# Patient Record
Sex: MALE | Race: WHITE | NOT HISPANIC OR LATINO | URBAN - METROPOLITAN AREA
[De-identification: names, ages, dates, MRNs, and addresses within clinical notes are randomized per-mention and may not be internally consistent; named-entity substitution may affect disease eponyms.]

---

## 2017-11-21 ENCOUNTER — EMERGENCY (EMERGENCY)
Facility: HOSPITAL | Age: 37
LOS: 1 days | Discharge: HOME | End: 2017-11-21
Attending: EMERGENCY MEDICINE
Payer: MEDICAID

## 2017-11-21 VITALS
HEART RATE: 120 BPM | TEMPERATURE: 100 F | HEIGHT: 73 IN | SYSTOLIC BLOOD PRESSURE: 157 MMHG | WEIGHT: 235.01 LBS | OXYGEN SATURATION: 99 % | RESPIRATION RATE: 18 BRPM | DIASTOLIC BLOOD PRESSURE: 82 MMHG

## 2017-11-21 DIAGNOSIS — T18.5XXA FOREIGN BODY IN ANUS AND RECTUM, INITIAL ENCOUNTER: ICD-10-CM
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
## 2017-11-21 PROCEDURE — 99284 EMERGENCY DEPT VISIT MOD MDM: CPT | Mod: 25

## 2017-11-21 PROCEDURE — 99282 EMERGENCY DEPT VISIT SF MDM: CPT

## 2017-11-21 NOTE — ED ADULT TRIAGE NOTE - CHIEF COMPLAINT QUOTE
c/o something inside the rectum that needs to be taken out,requested to explain only to the physician,denies pain but feels a little bit nervous,patient also added that they were enjoying at the party.

## 2017-11-21 NOTE — ED ADULT NURSE REASSESSMENT NOTE - NS ED NURSE REASSESS COMMENT FT1
650 am - pt able to move bowel in the ED and able to evacuate the cucumber out , pt states he felt better after  BM walking without difficulty and with steady gait pt is well appearing at this time no signs of any distress. dc home.

## 2017-11-21 NOTE — ED PROVIDER NOTE - OBJECTIVE STATEMENT
Pt admits to inserting a cumber in his rectum 1 hr prior to ED arrival. denies sexual assault states he placed it there for his own sexual pleasure. Pt defecated prior to evaluation and states cucumber dislodged and he flushed it down toilet. No further complaints.

## 2017-11-21 NOTE — ED PROVIDER NOTE - MEDICAL DECISION MAKING DETAILS
On evaluation pt states was able to have bowel movement and extricated cucumber.  Pt now eager to be discharged,  feels better, no complaints. Pt is well appearing walking with normal gait, stable for discharge and follow up with medical doctor. Pt educated on care and need for follow up. Discussed anticipatory guidance and return precautions. Questions answered. I had a detailed discussion with the patient and/or guardian regarding the historical points, exam findings, and any diagnostic results supporting the discharge diagnosis.

## 2018-08-14 NOTE — ED ADULT TRIAGE NOTE - HEIGHT IN FEET
Pts wife called in wanting to know if he needs to have the EKG done before he comes to the pre op apt please call [phone]. 6

## 2019-12-16 NOTE — ED ADULT NURSE NOTE - INTEGUMENTARY WDL
The Norma Campbell is not a covered benefit per [id number] at [employer]  Color consistent with ethnicity/race, warm, dry intact, resilient.

## 2020-07-07 DIAGNOSIS — Z01.818 ENCOUNTER FOR OTHER PREPROCEDURAL EXAMINATION: ICD-10-CM

## 2020-07-07 PROBLEM — Z00.00 ENCOUNTER FOR PREVENTIVE HEALTH EXAMINATION: Status: ACTIVE | Noted: 2020-07-07

## 2020-07-08 ENCOUNTER — APPOINTMENT (OUTPATIENT)
Dept: DISASTER EMERGENCY | Facility: CLINIC | Age: 40
End: 2020-07-08

## 2021-04-28 NOTE — ED ADULT NURSE NOTE - NS PRO AD NO ADVANCE DIRECTIVE
No Mohs Method Verbiage: An incision at a 90 degree angle following the standard Mohs approach was done and the specimen was harvested as a microscopic controlled layer.

## 2021-11-18 NOTE — ED PROVIDER NOTE - PSYCHIATRIC, MLM
Procedure: Left Leg Angiogram     Procedure Date :  12/7/21     Procedure Time :  8am     Arrival Time: 7am     Covid Test: 12/3/21     Post Procedure Appointment: 12/20/21     Admission Type: Outpatient     Surgeon: MD Wendy Delgadillo     Procedure Location: [facility] in    Alert and oriented to person, place, time/situation. normal mood and affect. no apparent risk to self or others.

## 2022-07-20 ENCOUNTER — EMERGENCY (EMERGENCY)
Facility: HOSPITAL | Age: 42
LOS: 1 days | Discharge: HOME | End: 2022-07-20
Attending: EMERGENCY MEDICINE | Admitting: EMERGENCY MEDICINE

## 2022-07-20 VITALS
SYSTOLIC BLOOD PRESSURE: 142 MMHG | OXYGEN SATURATION: 97 % | HEART RATE: 75 BPM | HEIGHT: 73 IN | DIASTOLIC BLOOD PRESSURE: 79 MMHG | RESPIRATION RATE: 18 BRPM

## 2022-07-20 VITALS
TEMPERATURE: 98 F | SYSTOLIC BLOOD PRESSURE: 152 MMHG | RESPIRATION RATE: 17 BRPM | OXYGEN SATURATION: 97 % | HEART RATE: 67 BPM | DIASTOLIC BLOOD PRESSURE: 100 MMHG

## 2022-07-20 PROCEDURE — 99283 EMERGENCY DEPT VISIT LOW MDM: CPT

## 2022-07-20 RX ORDER — IBUPROFEN 200 MG
600 TABLET ORAL ONCE
Refills: 0 | Status: COMPLETED | OUTPATIENT
Start: 2022-07-20 | End: 2022-07-20

## 2022-07-20 RX ADMIN — Medication 600 MILLIGRAM(S): at 22:52

## 2022-07-20 NOTE — ED ADULT TRIAGE NOTE - CHIEF COMPLAINT QUOTE
Pt is c/o left foot pain since three days ago and has selling to the left lower leg. Pt says he had laser treatment for varicose vein in the right leg last week. Denies fall or injury. No PMH

## 2022-07-20 NOTE — ED PROVIDER NOTE - CLINICAL SUMMARY MEDICAL DECISION MAKING FREE TEXT BOX
42yo M w/ pmh as above p/w Left foot pain. H&P most consistent w/ plantar fasciitis. NSAIDS, instructed pt on how to tape foot for arch support, rest and outpt ortho f/u if necessary.

## 2022-07-20 NOTE — ED PROVIDER NOTE - NSFOLLOWUPCLINICS_GEN_ALL_ED_FT
[facility] Orthopedic Surgery  Orthopedic Surgery  [address], 3rd & 4th [place] [address]  Phone: [phone]  Fax:

## 2022-07-20 NOTE — ED PROVIDER NOTE - PATIENT PORTAL LINK FT
You can access the FollowMyHealth Patient Portal offered by [facility] by registering at the following website: [email or web address]. By joining [facility]’s FollowMyHealth portal, you will also be able to view your health information using other applications (apps) compatible with our system.

## 2022-07-20 NOTE — ED ADULT TRIAGE NOTE - WILL THE PATIENT ACCEPT THE PFIZER COVID-19 VACCINE IF ELIGIBLE AND IT IS AVAILABLE?
FONT COLOR=\"[id number]\">YESI MINOR  : 1942  ACCOUNT:  [id number]  [phone]  PCP: Dr. Cheng Larkin     TODAY'S DATE: 2018  DICTATED BY:  [Alysha Maloney]      CHIEF COMPLAINT: [Followup of Atrial fibrillation, s/p cardioversion, Followup of Palpitations and Hospital D/C.]    HPI:    [On 2018, Yesi Minor, a 75 year old female, presented with dizziness, headaches. and left eye hemorrhage w/reduced visual acuity.]    Yesi is a 75-year-old patient of Dr. Tapia is followed by us for paroxysmal atrial fibrillation and history of [place] type B thoracic abdominal aortic dissection.  She is status post cardioversion for atrial flutter a year and a half ago and was placed on amiodarone thereafter for which she developed optic neuritis.  Patient was evaluated by Dr. So at [place] to consider atrial fibrillation ablation but was not deemed a candidate at that time.  She was rehospitalized with atrial fibrillation with RVR necessitating DC cardioversion a few months ago.  Sotalol 80 mg twice daily was initiated and she was monitored for 3 days during which time no QT prolongation was observed.    She presents today status post ER discharge from [facility] emergency room.  She will go up 4 days ago and her left eye was significantly swollen and bright red with blood.  She experienced left eye hemorrhaging.  She was evaluated at the ER and sent home and asked to follow-up with her cardiology cardiologist.  Since her last DC cardioversion a 30 day event monitor was completed which indicated normal sinus rhythm throughout with occasional PACs and one episode of PSVT for 19 beats.  Patient is anxious to come off of Coumadin.    Patient denies chest pain, shortness of breath,  and palpitations.  Patient states that she does experience lightheadedness and dizziness on occasion with position change.  Her lightheadedness and dizziness has been worse this past week due to the  hemorrhage in her left eye, headaches and the disturbance that is brought to her equilibrium.        RISK FACTORS:  CAD - Hypertension    REVIEW OF SYSTEMS:    CONS: headaches. EYES:  left eye hemorrhage w/reduced visual acuity right>left. ENMT: no sore throat or difficulty swallowing. CV: no chest pains. RESP: denies dyspnea, cough or wheezing. MS: denies muscle aches or pains. NEURO: occasional lightheaded,dizziness. HEM/LYMPH: denies easy bruising. ALL: no new food or enviornmental allergies.      PAST HISTORY: GERD, hypothyroidism, transient ischemic attack (TIA), osteoarthritis, DJD, IBS, pneumonia, hysterectomy and breast biopsy    PAST CV HISTORY: atrial fibrillation, cardioversion 2015, cardioversion atrial fibrillation 2013, dyslipidemia, hypertension, supraventricular tachycardia and Type B aortic dissection from distal left SCV artery to aortic bifurcation in the abdomen    FAMILY HISTORY: Negative for premature CAD. Negative for AAA.  SOCIAL HISTORY: SMOKING: Never used tobacco. denies smoking. CAFFEINE: 2 cups coffee daily and half and half. ALCOHOL: drinks rarely. EXERCISE: no regular exercise. DIET: no special diet. MARITAL STATUS: .     ALLERGIES: Codeine Phosphate - Injection, Nausea, Iodinated Contrast Media - CLASS, Anaphylaxis, Iodinated Diagnostic Agents - CLASS, Anaphylaxis and Statins - CLASS    MEDICATIONS: Selected prescriptions see below    VITAL SIGNS: [B/P - 128/70 , Pulse - 64, Respiration - 16, Weight -  148, Height -   64 , BMI - 25.4 ]    CONS: well developed, well nourished. HEAD/FACE: no trauma and normocephalic. ENT: mucosa pink and moist. NECK: jugular venous pressure not elevated. RESP: respirations with normal rate and rhythm, clear to auscultation. GI: soft, nontender. MS: adequate gait for exercise/testing. EXT: no edema.  SKIN: no rashes, lesions, ulcers.  NEURO/PSYCH: alert and oriented to time, place and person and normal affect.      CV: PALP: PMI not displaced, no  lifts and thrills or rub. AUSC:  regular rhythm, normal S1, S2 without S3; no pathologic murmurs. PEDAL: pedal pulses intact. EXT: no peripheral edema.     LABORATORY DATA: [00]    DECISION MAKING:    At this time, patient is to continue normal sinus rhythm and has throughout her 30 day event monitor.  We will take her off her Coumadin due to significant bleeding issues over the past week.  I have advised her to continue to monitor her home blood pressure and her symptoms of lightheadedness and dizziness.  If after the bleeding in the left eye resolves and her equilibrium returns to baseline, she is still experiencing lightheadedness and dizziness we will likely decrease her antihypertensive medications specifically stopping amlodipine.  Patient will follow up with phone call if her symptoms persist.  Patient has follow-up with Dr. Tapia in 6 weeks.    ASSESSMENT:  1. Atrial fibrillation, s/p cardioversion  2. Atrial flutter, unspecified, s/p cardioversion  3. Dissection Of Aorta Thoracoabdominal  4. Hypercholesterolemia, pure  5. Hypertension, Benign  6. Palpitations  7. Unspecified Amblyopia, Left Eye  8. Unspecified Optic Neuritis      PLAN:  [  1.  Stop Coumadin  2.  Follow-up with Dr. Tapia as scheduled in 6 weeks  3.  Call the office if new or worsening symptoms]    PRESCRIPTIONS:   03/26/18 *Sotalol HCl          80MG      1 TABLET TWICE DAILY.                    04/13/18 Synthroid             25MCG     daily                                    02/26/18 AmLODIPine Besylate   2.5MG     1 TABLET DAILY AS DIRECTED.              02/26/18 Lisinopril            10MG      1 TABLET TWICE DAILY.                             Not applicable

## 2022-07-20 NOTE — ED ADULT NURSE NOTE - OBJECTIVE STATEMENT
Patient is a 41-year-old male, A&OX3, ambulatory denies any pertinent medical Hx c/o L foot pain X 3 days. Pt states every morning at 0600 am, patient wakes up with sharp foot pain to bottom of foot. Pt friend made concern for blood clots. Pt with varicose veins. Pt pain 1/10 at present. MD Garrison at bedside for evaluation. Breathing even and unlabored, chest rise equal b/l. Safety maintained. Will continue to monitor.

## 2022-07-20 NOTE — ED ADULT NURSE NOTE - PRIMARY CARE PROVIDER
"Recorded as Task  Date: 03/15/2017 04:53 PM, Created By: Jackie Mojica  Task Name: 4. Patient Message  Assigned To: Amy Chatterjee  Regarding Patient: Kenya John, Status: Active  CommentCharwenceslao Mojica - 15 Mar 2017 4:53 PM    Patient Message to Provider  ""REASON FOR CALL: Gregor Chavarria the at home nurse called to inform you the patient was admitted for home care today for PT and OT. CALLER'S RELATIONSHIP TO PATIENT: __Self _  IF OTHER, NAME AND RELATIONSHIP: ___    BEST NUMBER TO BE CONTACTED: [phone]  ALTERNATIVE PHONE NUMBER: ___    Turnaround time given to caller: Yes   \""\""THIS MESSAGE WILL BE SENT TO YOUR PROVIDER, THE CLINICAL TEAM WILL RETURN YOUR CALL AS SOON AS THEY HAVE REVIEWED YOUR MESSAGE\""\""    READ BACK MESSAGE TO PATIENT\""  Conrado Pardo - 15 Mar 2194 5:13 PM    TASK EDITED  [address]      Electronically signed Kiara Apodaca M.D.   Mar 16 2017  6:20AM CST    " unk

## 2022-07-20 NOTE — ED PROVIDER NOTE - PHYSICAL EXAMINATION
Gen: Alert, NAD  Head: NC, AT,  EOMI, normal lids/conjunctiva  Mskel: no edema/erythema/cyanosis, +ttp along plantar fascia of Left foot  Skin: no rash  Neuro: AAOx3, no sensory/motor deficits, CN 2-12 intact

## 2022-07-20 NOTE — ED PROVIDER NOTE - NSFOLLOWUPINSTRUCTIONS_ED_ALL_ED_FT
As discussed, your symptoms and physical exam are most consistent with plantar fasciitis.    Please take ibuprofen (600mg) every 8 hours for the next two days to help reduce inflammation of your foot. After that, take 600mg ibuprofen every 8 hours only as needed for pain.     Tape foot as instructed and massage, stretch foot in the morning using tennis ball as instructed.    Avoid physical activity (other than walking) on your left foot for 1 week.    If your symptoms have not resolved within 1 to 2 weeks, please call the bone doctor at the number listed above to schedule a follow up appointment.    Return to the emergency department if you experience fever, chills, severe shortness of breath, severe pain, worsening symptoms, or any other symptoms that are concerning to you.

## 2022-07-20 NOTE — ED PROVIDER NOTE - OBJECTIVE STATEMENT
Pertinent PMH/PSH/FHx/SHx and Review of Systems contained within:  40yo M w/ pmh of varicose veins s/p laser therapy of RLE varicose veins last week, p/w plantar foot pain x 3 days. Endorses running on treadmill and playing basketball for last week after few weeks of not working out. Pain sharp, plantar aspect of Left foot, worse in morning, improves as pt stretches foot and as day progresses. Denies recent fall or trauma.     No fever/chills, No photophobia/eye pain/changes in vision, No ear pain/sore throat/dysphagia, No chest pain/palpitations, no SOB/cough/wheeze/stridor, No abdominal pain, No N/V/D, no dysuria/frequency/discharge, No neck/back pain, no rash, no new focal neuro symptoms.

## 2022-09-07 ENCOUNTER — RESULT CHARGE (OUTPATIENT)
Age: 42
End: 2022-09-07

## 2022-09-07 ENCOUNTER — APPOINTMENT (OUTPATIENT)
Dept: ORTHOPEDIC SURGERY | Facility: CLINIC | Age: 42
End: 2022-09-07

## 2022-09-07 VITALS — HEIGHT: 73 IN | BODY MASS INDEX: 31.81 KG/M2 | WEIGHT: 240 LBS

## 2022-09-07 DIAGNOSIS — Z78.9 OTHER SPECIFIED HEALTH STATUS: ICD-10-CM

## 2022-09-07 DIAGNOSIS — M72.2 PLANTAR FASCIAL FIBROMATOSIS: ICD-10-CM

## 2022-09-07 PROCEDURE — 99203 OFFICE O/P NEW LOW 30 MIN: CPT

## 2022-09-07 PROCEDURE — 73630 X-RAY EXAM OF FOOT: CPT | Mod: LT

## 2022-09-07 NOTE — ASSESSMENT
[FreeTextEntry1] : The nature of plantar fasciits was discussed with the patient, as was its typically self-limiting nature. The patient was advised to wear gel heel pads, use nsaids if able, and recommended to see physical therapy for a stretching program.\par \par f/up after course of PT if not resolved

## 2022-09-07 NOTE — HISTORY OF PRESENT ILLNESS
[Gradual] : gradual [10] : 10 [1] : 2 [Dull/Aching] : dull/aching [Sharp] : sharp [Frequent] : frequent [Nothing helps with pain getting better] : Nothing helps with pain getting better [Standing] : standing [Walking] : walking [Exercising] : exercising [de-identified] : 9/7/22: 6 weeks plantar heel pain. no injury. no tx to date. no prior foot probs. no dm. former tob.  [] : no [FreeTextEntry1] : LEFT FOOT [FreeTextEntry3] : 2 MONTHS [FreeTextEntry5] : PT STATED HE WAS DIAGNOSED WITH PlantarFasciitis WHICH HAS PROGRESSIVELY GOTTEN WORSE

## 2023-05-18 ENCOUNTER — EMERGENCY (EMERGENCY)
Facility: HOSPITAL | Age: 43
LOS: 1 days | Discharge: HOME | End: 2023-05-18
Attending: EMERGENCY MEDICINE | Admitting: EMERGENCY MEDICINE
Payer: MEDICAID

## 2023-05-18 VITALS
RESPIRATION RATE: 16 BRPM | SYSTOLIC BLOOD PRESSURE: 143 MMHG | HEART RATE: 100 BPM | TEMPERATURE: 98 F | DIASTOLIC BLOOD PRESSURE: 101 MMHG | OXYGEN SATURATION: 100 %

## 2023-05-18 VITALS
HEART RATE: 73 BPM | DIASTOLIC BLOOD PRESSURE: 89 MMHG | SYSTOLIC BLOOD PRESSURE: 124 MMHG | RESPIRATION RATE: 18 BRPM | TEMPERATURE: 99 F | OXYGEN SATURATION: 98 %

## 2023-05-18 PROCEDURE — 99284 EMERGENCY DEPT VISIT MOD MDM: CPT

## 2023-05-18 PROCEDURE — 93971 EXTREMITY STUDY: CPT | Mod: 26,LT

## 2023-05-18 RX ORDER — IBUPROFEN 200 MG
600 TABLET ORAL ONCE
Refills: 0 | Status: COMPLETED | OUTPATIENT
Start: 2023-05-18 | End: 2023-05-18

## 2023-05-18 RX ADMIN — Medication 600 MILLIGRAM(S): at 16:36

## 2023-05-18 NOTE — ED PROVIDER NOTE - OBJECTIVE STATEMENT
Attending/Santosh: 43 yo M h/o varicose veins including s/p laser therapy of RLE varicose veins in 2022 p/w to the ED of LLE varicose pain. He denies fever/chills, weakness, recent trauma, or bleeding./ Pt has an appointment tomorrow with his vascular surgeon Dr. Dilma Church in anticipating of a procedure.

## 2023-05-18 NOTE — ED PROVIDER NOTE - NS ED ATTENDING STATEMENT MOD
This was a shared visit with the MAURICIO. I reviewed and verified the documentation and independently performed the documented:

## 2023-05-18 NOTE — ED PROVIDER NOTE - PHYSICAL EXAMINATION
Attending/Santosh: Well-appearing, NAD; PERRL/EOMI, non-icterus, supple, no BRIONNA, no JVD, RRR, CTAB; Abd-soft, NT/ND, no HSM; no LE edema, A&Ox3, nonfocal; Skin-warm/dry/+ large left varicose veins, no overlying erythema or swelling

## 2023-05-18 NOTE — ED ADULT NURSE NOTE - NSFALLUNIVINTERV_ED_ALL_ED
Bed/Stretcher in lowest position, wheels locked, appropriate side rails in place/Call bell, personal items and telephone in reach/Instruct patient to call for assistance before getting out of bed/chair/stretcher/Non-slip footwear applied when patient is off stretcher/[place] to call system/Physically safe environment - no spills, clutter or unnecessary equipment/Purposeful proactive rounding/Room/bathroom lighting operational, light cord in reach

## 2023-05-18 NOTE — ED PROVIDER NOTE - SKIN, MLM
Skin normal color for race, warm, dry and intact. No evidence of rash. LLE with large varicose vein, distal pulses intact. sensation intact, FROM of all joints, no erythema, warmth

## 2023-05-18 NOTE — ED PROVIDER NOTE - NSPTACCESSSVCSAPPTDETAILS_ED_ALL_ED_FT
within 2-3 days for large varicose vein. within 1-3 days for large varicose vein, was told by other vascular specialist he needed surgery ( was going to perform it in outpatient setting)  patient would like surgery to be performed in hospital setting.

## 2023-05-18 NOTE — ED ADULT NURSE NOTE - OBJECTIVE STATEMENT
Pt received to wellness center, awake and alert, A&OX4, ambulatory. C/o LLE swelling and pain to varicose vein. Respirations even and unlabored. Resting comfortably. Denies CP, SOB, N/V, HA, dizziness, palpitations. Safety maintained

## 2023-05-18 NOTE — ED PROVIDER NOTE - CLINICAL SUMMARY MEDICAL DECISION MAKING FREE TEXT BOX
A/P 43 yo M with large varicose veins p/w pain. exam not noted for erythema, swelling or PT.  Plan: LLE duplex, analgesia, conservative management, f/u vasc surgeon tomorrow  Relevant EMR reviewed

## 2023-05-18 NOTE — ED PROVIDER NOTE - PATIENT PORTAL LINK FT
You can access the FollowMyHealth Patient Portal offered by [facility] by registering at the following website: [email or web address]. By joining [facility]’s FollowMyHealth portal, you will also be able to view your health information using other applications (apps) compatible with our system.

## 2023-05-18 NOTE — ED PROVIDER NOTE - NSFOLLOWUPINSTRUCTIONS_ED_ALL_ED_FT
Follow up with your Primary Medical Doctor within 2-3days. If results or reports were given to you, show copies of your reports given to you. Take all of your medications as previously prescribed.    If you have issues obtaining follow up, please call: [phone] to obtain a doctor or specialist who takes your insurance in your area.    Please follow up with Dr. Perera or Vascular Specialist    If an new fevers, bleeding, worsening swelling, numbness or any other new or concerning symptoms return to the ER.

## 2023-05-26 ENCOUNTER — APPOINTMENT (OUTPATIENT)
Dept: VASCULAR SURGERY | Facility: CLINIC | Age: 43
End: 2023-05-26

## 2023-06-09 ENCOUNTER — APPOINTMENT (OUTPATIENT)
Dept: VASCULAR SURGERY | Facility: CLINIC | Age: 43
End: 2023-06-09

## 2024-02-23 NOTE — ED ADULT NURSE NOTE - NS_SISCREENINGSR_GEN_ALL_ED
[FreeTextEntry1] : EXAMINATION:  EMG/UROFLOW  PERFORMED IN THE OFFICE TODAY    FINDINGS:  NORMAL FLOW WITHOUT BLADDER SPHINCTER DYSSYNERGIA; PVR 34 ML (23% OF TOTAL VOLUME)  Negative